# Patient Record
Sex: MALE | Race: WHITE | NOT HISPANIC OR LATINO | Employment: FULL TIME | URBAN - METROPOLITAN AREA
[De-identification: names, ages, dates, MRNs, and addresses within clinical notes are randomized per-mention and may not be internally consistent; named-entity substitution may affect disease eponyms.]

---

## 2020-01-24 ENCOUNTER — HOSPITAL ENCOUNTER (OUTPATIENT)
Dept: RADIOLOGY | Facility: MEDICAL CENTER | Age: 65
End: 2020-01-24
Attending: INTERNAL MEDICINE
Payer: COMMERCIAL

## 2020-01-24 DIAGNOSIS — M25.552 LEFT HIP PAIN: ICD-10-CM

## 2020-01-24 PROCEDURE — 73721 MRI JNT OF LWR EXTRE W/O DYE: CPT | Mod: LT

## 2021-03-03 DIAGNOSIS — Z23 NEED FOR VACCINATION: ICD-10-CM

## 2022-01-16 ENCOUNTER — HOSPITAL ENCOUNTER (EMERGENCY)
Facility: MEDICAL CENTER | Age: 67
End: 2022-01-16
Attending: EMERGENCY MEDICINE
Payer: MEDICARE

## 2022-01-16 VITALS
RESPIRATION RATE: 16 BRPM | BODY MASS INDEX: 31.28 KG/M2 | HEART RATE: 88 BPM | TEMPERATURE: 98.5 F | SYSTOLIC BLOOD PRESSURE: 169 MMHG | WEIGHT: 218.48 LBS | HEIGHT: 70 IN | DIASTOLIC BLOOD PRESSURE: 99 MMHG | OXYGEN SATURATION: 95 %

## 2022-01-16 DIAGNOSIS — Z20.822 CLOSE EXPOSURE TO COVID-19 VIRUS: ICD-10-CM

## 2022-01-16 DIAGNOSIS — J06.9 UPPER RESPIRATORY TRACT INFECTION, UNSPECIFIED TYPE: ICD-10-CM

## 2022-01-16 PROCEDURE — U0005 INFEC AGEN DETEC AMPLI PROBE: HCPCS

## 2022-01-16 PROCEDURE — U0003 INFECTIOUS AGENT DETECTION BY NUCLEIC ACID (DNA OR RNA); SEVERE ACUTE RESPIRATORY SYNDROME CORONAVIRUS 2 (SARS-COV-2) (CORONAVIRUS DISEASE [COVID-19]), AMPLIFIED PROBE TECHNIQUE, MAKING USE OF HIGH THROUGHPUT TECHNOLOGIES AS DESCRIBED BY CMS-2020-01-R: HCPCS

## 2022-01-16 PROCEDURE — 99283 EMERGENCY DEPT VISIT LOW MDM: CPT

## 2022-01-17 LAB
SARS-COV-2 RNA RESP QL NAA+PROBE: DETECTED
SPECIMEN SOURCE: ABNORMAL

## 2022-01-17 NOTE — ED NOTES
Reviewed discharge paperwork with patient, spoke to him about mychart. No questions at this time. Walked on own to car.

## 2022-01-17 NOTE — ED PROVIDER NOTES
"ED Provider Note    CHIEF COMPLAINT  Chief Complaint   Patient presents with   • Coronavirus Screening     Reports \"I need a COVID test, I just feel like I have a very mild cold\". Denies other complaints. States wife is COVID +.         HPI  Roel Andino is a 66 y.o. male who presents to the ED with complaints of nasal congestion mild cold symptoms.  Patient was exposed to COVID via his his wife who was positive last week and now the patient feels he might be coming down with symptoms.  The patient is vaccinated and he is here just for evaluation.    REVIEW OF SYSTEMS  See HPI for further details. All other systems are negative.     PAST MEDICAL HISTORY  Past Medical History:   Diagnosis Date   • Chronic back pain    • Diabetes    • Hypertension        FAMILY HISTORY  Family History   Problem Relation Age of Onset   • Cancer Mother      Patient's family history has been discussed and is been found to be noncontributory to his present illness  SOCIAL HISTORY  Social History     Socioeconomic History   • Marital status:      Spouse name: Not on file   • Number of children: Not on file   • Years of education: Not on file   • Highest education level: Not on file   Occupational History   • Not on file   Tobacco Use   • Smoking status: Passive Smoke Exposure - Never Smoker   • Smokeless tobacco: Not on file   Substance and Sexual Activity   • Alcohol use: Yes     Comment: rare   • Drug use: Not on file   • Sexual activity: Yes     Partners: Female   Other Topics Concern   • Not on file   Social History Narrative   • Not on file     Social Determinants of Health     Financial Resource Strain:    • Difficulty of Paying Living Expenses: Not on file   Food Insecurity:    • Worried About Running Out of Food in the Last Year: Not on file   • Ran Out of Food in the Last Year: Not on file   Transportation Needs:    • Lack of Transportation (Medical): Not on file   • Lack of Transportation (Non-Medical): Not on file "   Physical Activity:    • Days of Exercise per Week: Not on file   • Minutes of Exercise per Session: Not on file   Stress:    • Feeling of Stress : Not on file   Social Connections:    • Frequency of Communication with Friends and Family: Not on file   • Frequency of Social Gatherings with Friends and Family: Not on file   • Attends Buddhist Services: Not on file   • Active Member of Clubs or Organizations: Not on file   • Attends Club or Organization Meetings: Not on file   • Marital Status: Not on file   Intimate Partner Violence:    • Fear of Current or Ex-Partner: Not on file   • Emotionally Abused: Not on file   • Physically Abused: Not on file   • Sexually Abused: Not on file   Housing Stability:    • Unable to Pay for Housing in the Last Year: Not on file   • Number of Places Lived in the Last Year: Not on file   • Unstable Housing in the Last Year: Not on file      Pcp Pt States None      SURGICAL HISTORY  Past Surgical History:   Procedure Laterality Date   • ORIF, FRACTURE, FEMUR     • ORIF, WRIST     • SHOULDER ARTHROSCOPY W/ ROTATOR CUFF REPAIR         CURRENT MEDICATIONS  Home Medications    **Home medications have not yet been reviewed for this encounter**       No current facility-administered medications on file prior to encounter.     Current Outpatient Medications on File Prior to Encounter   Medication Sig Dispense Refill   • clindamycin (CLEOCIN) 300 MG CAPS Take 1 Cap by mouth 3 times a day. 30 Each 0   • metformin (GLUCOPHAGE) 500 MG TABS Take 500 mg by mouth 2 times a day.     • GLIPIZIDE PO Take  by mouth 2 Times a Day.     • hydrocodone-acetaminophen (LORTAB 7.5) 7.5-500 MG TABS Take 1 Tab by mouth every four hours as needed for Mild Pain. 30 Each 0   • simvastatin (ZOCOR) 10 MG TABS Take 10 mg by mouth every evening.     • Naproxen Sodium 220 MG CAPS Take 1 Cap by mouth as needed.     • ibuprofen (IBU) 800 MG TABS Take 800 mg by mouth every 8 hours as needed.     • LISINOPRIL PO Take 1  "Tab by mouth every day.           ALLERGIES  No Known Allergies    PHYSICAL EXAM  VITAL SIGNS: BP (!) 169/99   Pulse 88   Temp 36.9 °C (98.5 °F) (Temporal)   Resp 16   Ht 1.778 m (5' 10\")   Wt 99.1 kg (218 lb 7.6 oz)   SpO2 95%   BMI 31.35 kg/m²   Pulse Oximetry was obtained. It showed a reading of Pulse Oximetry: 94 %.  I interpreted this as nonhypoxic.   Constitutional :  Well developed, Well nourished, No acute distress, Non-toxic appearance.   HENT: Head is normal bowel TMs normal posterior pharynx is normal  Eyes: Conjunctiva is normal  Neck: Normal range of motion, No tenderness, Supple, No stridor.   Lymphatic: No anterior lymphadenopathy.   Cardiovascular: Normal heart rate, Normal rhythm, No murmurs, No rubs, No gallops.   Thorax & Lungs: There to auscultation bilaterally no rhonchi wheezes or rales  Skin: Warm, Dry, No erythema, No rash.   Extremities: Intact distal pulses, No edema, No tenderness, No cyanosis, No clubbing.       RADIOLOGY/PROCEDURES  Results for orders placed or performed during the hospital encounter of 01/16/22   SARS-CoV-2 PCR (24 hour In-House): Collect NP swab in VTM    Specimen: Respirate   Result Value Ref Range    SARS-CoV-2 Source NP Swab          COURSE & MEDICAL DECISION MAKING  Pertinent Labs & Imaging studies reviewed. (See chart for details)  Patient presents for evaluation.  Clinically the patient is slightly hypertensive which is underlying but not hypoxic and otherwise very minimal symptomatology.  He is on metformin as well as a simvastatin so I did discuss with him about the use of Paxlovid and because of the potential effects with these 2 medications the patient does not wish to have Paxlovid.  I have ordered for the 24-hour COVID test.  The patient is to maintain social distancing and current CDC guidelines for COVID.  Patient should return as needed.    FINAL IMPRESSION  1. Upper respiratory tract infection, unspecified type     2. Close exposure to COVID-19 " virus          The patient will return for new or worsening symptoms and is stable at the time of discharge.    The patient is referred to a primary physician for blood pressure management, diabetic screening, and for all other preventative health concerns.        DISPOSITION:  Patient will be discharged home in stable condition.    FOLLOW UP:  Your PCP    Schedule an appointment as soon as possible for a visit in 1 week  As needed, Return if any symptoms worsen      OUTPATIENT MEDICATIONS:  Discharge Medication List as of 1/16/2022  7:54 PM              Electronically signed by: Samson Baez M.D., 1/16/2022

## 2022-11-10 ENCOUNTER — PATIENT MESSAGE (OUTPATIENT)
Dept: HEALTH INFORMATION MANAGEMENT | Facility: OTHER | Age: 67
End: 2022-11-10

## 2025-05-16 ENCOUNTER — HOSPITAL ENCOUNTER (EMERGENCY)
Facility: MEDICAL CENTER | Age: 70
End: 2025-05-16
Attending: EMERGENCY MEDICINE
Payer: COMMERCIAL

## 2025-05-16 ENCOUNTER — APPOINTMENT (OUTPATIENT)
Dept: RADIOLOGY | Facility: MEDICAL CENTER | Age: 70
End: 2025-05-16
Attending: EMERGENCY MEDICINE
Payer: COMMERCIAL

## 2025-05-16 VITALS
OXYGEN SATURATION: 98 % | DIASTOLIC BLOOD PRESSURE: 80 MMHG | HEART RATE: 74 BPM | TEMPERATURE: 98 F | SYSTOLIC BLOOD PRESSURE: 128 MMHG | WEIGHT: 208.56 LBS | HEIGHT: 70 IN | RESPIRATION RATE: 16 BRPM | BODY MASS INDEX: 29.86 KG/M2

## 2025-05-16 DIAGNOSIS — M25.461 PAIN AND SWELLING OF RIGHT KNEE: Primary | ICD-10-CM

## 2025-05-16 DIAGNOSIS — M25.561 PAIN AND SWELLING OF RIGHT KNEE: Primary | ICD-10-CM

## 2025-05-16 PROCEDURE — 99284 EMERGENCY DEPT VISIT MOD MDM: CPT

## 2025-05-16 PROCEDURE — 73562 X-RAY EXAM OF KNEE 3: CPT | Mod: RT

## 2025-05-16 NOTE — ED TRIAGE NOTES
"/77   Pulse 77   Temp 36.7 °C (98 °F) (Temporal)   Resp 16   Ht 1.778 m (5' 10\")   Wt 94.6 kg (208 lb 8.9 oz)   SpO2 95%   BMI 29.92 kg/m²   Chief Complaint   Patient presents with    Knee Swelling     Started swelling Wednesday   unknown cause   stiffness to knee  limited ROM      Comes in w/ wife     "

## 2025-05-17 NOTE — ED PROVIDER NOTES
ED Provider Note    CHIEF COMPLAINT  Chief Complaint   Patient presents with    Knee Swelling     Started swelling Wednesday   unknown cause   stiffness to knee  limited ROM         HPI    Primary care provider: Pcp Pt States None   History obtained from: Patient and wife  History limited by: None     Roel Andino is a 70 y.o. male who presents to the ED with wife complaining of right knee swelling starting 2 days ago to the point that he had to use his crutches to get around at home.  He denies any injury/trauma but recently started working out including squats and exercise bike and rowing machine.  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  He has had prior right femur fracture and subsequent hardware.    REVIEW OF SYSTEMS  Please see HPI for pertinent positives/negatives.  All other systems reviewed and are negative.     PAST MEDICAL HISTORY  Past Medical History:   Diagnosis Date    Chronic back pain     Diabetes     Hypertension         SURGICAL HISTORY  Past Surgical History[1]     SOCIAL HISTORY  Social History     Tobacco Use    Smoking status: Never     Passive exposure: Yes    Smokeless tobacco: Never   Vaping Use    Vaping status: Never Used   Substance and Sexual Activity    Alcohol use: Yes     Comment: rare    Drug use: Not on file    Sexual activity: Yes     Partners: Female        FAMILY HISTORY  Family History   Problem Relation Age of Onset    Cancer Mother         CURRENT MEDICATIONS  Home Medications       Reviewed by Jenni Rashid R.N. (Registered Nurse) on 05/16/25 at 1501  Med List Status: Partial     Medication Last Dose Status   clindamycin (CLEOCIN) 300 MG CAPS  Active   GLIPIZIDE PO  Active   hydrocodone-acetaminophen (LORTAB 7.5) 7.5-500 MG TABS  Active   ibuprofen (IBU) 800 MG TABS  Active   LISINOPRIL PO  Active   metformin (GLUCOPHAGE) 500 MG TABS  Active   Naproxen  "Sodium 220 MG CAPS  Active   simvastatin (ZOCOR) 10 MG TABS  Active                  Audit from Redirected Encounters    **Home medications have not yet been reviewed for this encounter**          ALLERGIES  Allergies[2]     PHYSICAL EXAM  VITAL SIGNS: /80   Pulse 74   Temp 36.7 °C (98 °F) (Temporal)   Resp 16   Ht 1.778 m (5' 10\")   Wt 94.6 kg (208 lb 8.9 oz)   SpO2 98%   BMI 29.92 kg/m²  @EDENILSON[[id number]::@     Pulse ox interpretation: 95% I interpret this pulse ox as normal     Constitutional: Well developed, well nourished, alert in no apparent distress, nontoxic appearance    HENT: No external signs of trauma, normocephalic    Eyes: No discharge, no icterus     Neck: No stridor    Cardiovascular: Strong distal pulses and good perfusion    Thorax & Lungs: No respiratory distress    Extremities: No cyanosis, mild swelling right knee anteriorly without erythema/warmth/crepitus/fluctuance/streaking/drainage,, no gross deformity, good ROM, no laxity on stress testing, intact distal pulses with brisk cap refill    Skin: Warm, dry, no pallor/cyanosis, no rash noted    Neuro: A/O times 3, no focal deficits noted    Psychiatric: Cooperative, normal mood and affect, normal judgement, appropriate for clinical situation        DIAGNOSTIC STUDIES / PROCEDURES        LABS  All labs reviewed by me.     Results for orders placed or performed during the hospital encounter of 01/16/22   SARS-CoV-2 PCR (24 hour In-House): Collect NP swab in VTM    Collection Time: 01/16/22  7:59 PM    Specimen: Respirate   Result Value Ref Range    SARS-CoV-2 Source NP Swab     SARS-CoV-2 by PCR DETECTED (AA)         RADIOLOGY  I have independently interpreted the diagnostic imaging associated with this visit and am waiting the final reading from the radiologist.   My preliminary interpretation is as follows: No acute bony findings on the x-ray.    DX-KNEE 3 VIEWS RIGHT   Final Result      1.  No acute fracture or dislocation.   2.  " Moderate tricompartmental degenerative changes.   3.  Moderate joint effusion.             COURSE & MEDICAL DECISION MAKING  Nursing notes, VS, PMSFHx reviewed in chart.     Review of past medical records shows the [facility]      Differential diagnoses considered include but are not limited to: Arthritis, bursitis, tendonitis, strain/sprain      ED Observation Status? No; Patient does not meet criteria for ED Observation.       Discussion of management with other [facility] or appropriate source(s): None     Escalation of care considered, and ultimately not performed: acute inpatient care management, however at this time, the patient is most appropriate for outpatient management.     Decision tools and prescription drugs considered including, but not limited to: Pain Medications  .        History and physical exam as above.  This is a pleasant 70-year-old male patient with medical history including diabetes and hypertension who presents to the ED with above complaints.  X-rays of the right knee without evidence for acute bony injury.  I discussed the findings with patient and wife.  Exam is not consistent with infectious process.  No evidence for limb ischemia/DVT/vascular occlusion.  I discussed with him possible tendinitis/bursitis or strain/sprain.  Patient has knee brace and crutches to use as needed at home.  He will be given outpatient referral to orthopedics for follow-up.  Return to ED precautions given.  Patient verbalized understanding and agreed with plan of care with no further questions or concerns.      The patient is referred to a primary physician for blood pressure management, diabetic screening, and for all other preventative health concerns.       FINAL IMPRESSION  1. Pain and swelling of right knee Acute          DISPOSITION  Patient will be discharged home in stable condition.       FOLLOW UP  Antonio Omalley M.D.  [address] [place]  [id number] [phone]    Call in 3 days      [facility], Emergency Dept  [address]  [place] [id number] [phone]    If symptoms worsen         OUTPATIENT MEDICATIONS  New Prescriptions    No medications on file          Electronically signed by: David Ferrell D.O., 5/16/2025 5:24 PM      Portions of this record were made with voice recognition software.  Despite my review, errors may remain.  Please interpret this chart in the appropriate context.         [1]   Past Surgical History:  Procedure Laterality Date    ORIF, FRACTURE, FEMUR      ORIF, WRIST      SHOULDER ARTHROSCOPY W/ ROTATOR CUFF REPAIR     [2] No Known Allergies